# Patient Record
(demographics unavailable — no encounter records)

---

## 2025-05-03 NOTE — ASSESSMENT
[FreeTextEntry1] : 74M with a subacute left 5th digit middle phalanx fx  - explained that given the chronicity of the fracture in order to fix it we would need to make an incision, break up the callus, and fix the fracture. This would likely get the finger straight but result in a stiff and less functional digit especially since he still can make a strong fist. For this reason I recommend tank straps for 4 more weeks then can DC and return to all activity. Finger will be slightly crooked but more functional than with surgery. -All questions answered, patient in agreement

## 2025-05-03 NOTE — HISTORY OF PRESENT ILLNESS
[Right] : right hand dominant [FreeTextEntry1] : DOI: 4/7/25 FARZAD: left 5th digit slammed in a door  Swelled uo a lot, painful   Slowly getting better but has a small crooked aspect towards radial side   No numbness or tingling, still can make a fist

## 2025-05-03 NOTE — PHYSICAL EXAM
[de-identified] : General: No acute distress Respiratory: Nonlabored Cardiology: Warm well-perfused   Left small finger  Skin: Intact, Minor swelling but no ecchymosis  Slight radial deviation of the digit at the DIPJ Motor: Fires FDS FDP EDC Sensation: No numbness or tingling Vascular: Warm and well-perfused brisk cap refill No deformity of adjacent digits Can make a full fist without malrotation/overlap     [de-identified] :  3 views of the left small finger were obtained and reviewed in clinic showing a long oblique fracture of the middle phalanx with callus formation

## 2025-05-03 NOTE — PHYSICAL EXAM
[de-identified] : General: No acute distress Respiratory: Nonlabored Cardiology: Warm well-perfused   Left small finger  Skin: Intact, Minor swelling but no ecchymosis  Slight radial deviation of the digit at the DIPJ Motor: Fires FDS FDP EDC Sensation: No numbness or tingling Vascular: Warm and well-perfused brisk cap refill No deformity of adjacent digits Can make a full fist without malrotation/overlap     [de-identified] :  3 views of the left small finger were obtained and reviewed in clinic showing a long oblique fracture of the middle phalanx with callus formation

## 2025-06-03 NOTE — ASSESSMENT
[FreeTextEntry1] : 74M with a subacute left 5th digit middle phalanx fx  Again reviewed that given the chronicity of the fracture I do not feel surgery would actually improve his overall hand function.  It would make the finger straight but it could compromise his ability to make a fist which she can do right now.  I explained he should continue with all activities as tolerated and the swelling will continue to improve.  If any issues develop he can return as

## 2025-06-03 NOTE — HISTORY OF PRESENT ILLNESS
[Right] : right hand dominant [FreeTextEntry1] : DOI: 4/7/25 FARZAD: left 5th digit slammed in a door  Overall patient is improving with his range of motion.  The finger remains slightly angulated but he can make a strong fist and has been able to return to all activities

## 2025-06-03 NOTE — PHYSICAL EXAM
[de-identified] : General: No acute distress Respiratory: Nonlabored Cardiology: Warm well-perfused   Left small finger  Skin: Intact, Minor swelling but no ecchymosis  Slight radial deviation of the digit at the DIPJ Motor: Fires FDS FDP EDC Sensation: No numbness or tingling Vascular: Warm and well-perfused brisk cap refill No deformity of adjacent digits Can make a full fist without malrotation/overlap